# Patient Record
Sex: FEMALE | Race: BLACK OR AFRICAN AMERICAN | NOT HISPANIC OR LATINO | Employment: STUDENT | URBAN - METROPOLITAN AREA
[De-identification: names, ages, dates, MRNs, and addresses within clinical notes are randomized per-mention and may not be internally consistent; named-entity substitution may affect disease eponyms.]

---

## 2017-07-13 ENCOUNTER — OFFICE VISIT (OUTPATIENT)
Dept: FAMILY MEDICINE | Facility: CLINIC | Age: 22
End: 2017-07-13
Payer: COMMERCIAL

## 2017-07-13 VITALS
TEMPERATURE: 98 F | DIASTOLIC BLOOD PRESSURE: 68 MMHG | RESPIRATION RATE: 20 BRPM | WEIGHT: 154 LBS | HEART RATE: 82 BPM | SYSTOLIC BLOOD PRESSURE: 110 MMHG | BODY MASS INDEX: 23.34 KG/M2 | OXYGEN SATURATION: 99 % | HEIGHT: 68 IN

## 2017-07-13 DIAGNOSIS — R76.11 POSITIVE PPD: ICD-10-CM

## 2017-07-13 DIAGNOSIS — Z00.00 WELL WOMAN EXAM (NO GYNECOLOGICAL EXAM): Primary | ICD-10-CM

## 2017-07-13 DIAGNOSIS — Z22.7 INACTIVE TB: ICD-10-CM

## 2017-07-13 PROCEDURE — 99999 PR PBB SHADOW E&M-NEW PATIENT-LVL III: CPT | Mod: PBBFAC,,, | Performed by: FAMILY MEDICINE

## 2017-07-13 PROCEDURE — 99385 PREV VISIT NEW AGE 18-39: CPT | Mod: S$GLB,,, | Performed by: FAMILY MEDICINE

## 2017-07-13 NOTE — PROGRESS NOTES
"Well Woman VISIT      CHIEF COMPLAINT  Chief Complaint   Patient presents with    PPD Reading     Positive        HPI  Elvira Hernandez is a 22 y.o. female who presents well female.     She is also trying to get health forms filled out for school.  She went to another doctor earlier this week, had PPD which was positive - per patient it was 26mm.  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 She is asymptomatic.  She had a negative PPD in 2013.  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  She states "these forms need to be filled out by today for school." she didn't get blood testing from the doctors office earlier this week.  She did get a CXR, doesn't have results yet.    Social Factors  Tobacco use: No  Alcohol: No  Regular Exercise: Yes     Depression  Over the past two weeks, have you felt down, depressed, or hopeless? No  Over the past two weeks, have you felt little interest or pleasure in doing things? No    Reproductive Health  Last menstrual period began June 20, 2017  Patient is sexually active  Contraception: condoms  On Daily folic acid: no  STD screening in last year: no  Last PAP:  June 2016 - normal  HIV screening: no    CHD, HTN, DM2  CHD Risk Factors: none  Women 45 years and older should be screened for dyslipidemia if at increased risk of CHD  Women 20 to 45 years of age should be screened for dyslipidemia if at increased risk of CHD  Asymptomatic adults with sustained blood pressure greater than 135/80 mm Hg (treated or untreated) should be screened for type 2 diabetes mellitus    Estimated body mass index is 23.42 kg/m² as calculated from the following:    Height as of this encounter: 5' 8" (1.727 m).    Weight as of this encounter: 69.9 kg (154 lb).      Screening  Mammogram needed: no  Colonoscopy needed: no  Osteoporosis screen needed: no     Women 50 to 74 years of age should be screened for breast cancer with " "mammography biennially.  Women should be screened for cervical cancer with Pap tests beginning at 21 years of age. Low-risk women should receive Pap testing every three years. Co-testing for human papillomavirus is an option beginning at 30 years of age, and can extend the screening interval to five years. Cervical cancer screening should be discontinued at 65 years of age or after total hysterectomy if the woman has a benign gynecologic history  Adults 50 to 75 years of age should be screened for colorectal cancer with an FOBT annually, sigmoidoscopy every five years with an FOBT every three years, or colonoscopy every 10 years.  Women 65 years and older should be screened for osteoporosis. Women younger than 65 years should be screened if the risk of fracture is greater than or equal to that of a 65-year-old white woman without additional risk factors.      Immunizations  up to date and documented    ALLERGIES and MEDS were verified.   PMHx, PSHx, FHx, SOCIALHx were updated as pertinent.    REVIEW OF SYSTEMS  Negative 5 point review of systems    PHYSICAL EXAM  VITAL SIGNS: /68 (BP Location: Right arm, Patient Position: Sitting, BP Method: Manual)   Pulse 82   Temp 98.1 °F (36.7 °C) (Oral)   Resp 20   Ht 5' 8" (1.727 m)   Wt 69.9 kg (154 lb)   LMP  (LMP Unknown)   SpO2 99%   BMI 23.42 kg/m²   GEN: Well developed, Well nourished, No acute distress.  HENT: Normocephalic, Atraumatic, Bilateral external ears normal, Nose normal, Oropharynx moist, No oral exudates.   Eyes: PERRLA, EOMI, Conjunctiva normal, No discharge.   Neck: Supple, No tenderness.  Lymphatic: No cervical or supraclavicular lymphadenopathy noted.   Cardiovascular: Normal heart rate, Normal rhythm, No murmurs, No rubs, No gallops.   Thorax & Lungs: Normal breath sounds, No respiratory distress, No wheezing.  Abdomen: Soft, No tenderness, Bowel sounds normal.  Skin: Warm, Dry, No erythema, No rash.   Extremities: No edema, No tenderness. "       ASSESSMENT/PLAN    Elvira was seen today for ppd reading.    Diagnoses and all orders for this visit:    Well woman exam (no gynecological exam)  - orders placed for lab testing    Positive PPD  - Patient told she has to get us records about the CXR, and PPD.  She'll have to also get her lab testing if she wants to get treated for Tb with INH.   She's leaving for school next Tuesday.  She'll have to go to a doctor up there for f/u once a month for LFTs.     FOLLOW UP:  4 weeks

## 2017-07-18 ENCOUNTER — TELEPHONE (OUTPATIENT)
Dept: FAMILY MEDICINE | Facility: CLINIC | Age: 22
End: 2017-07-18

## 2017-07-18 NOTE — TELEPHONE ENCOUNTER
Was calling patient to schedule her labs. Patient states that she returned to the other where she was 1st seen and everything was all cleared and her documents for school were completed there.

## 2017-07-18 NOTE — TELEPHONE ENCOUNTER
----- Message from Selena Winkler sent at 7/18/2017  8:33 AM CDT -----  Contact: Self  Pt states she's returning a call. Pt can be reached @ [phone]